# Patient Record
Sex: FEMALE | Race: WHITE | NOT HISPANIC OR LATINO | URBAN - METROPOLITAN AREA
[De-identification: names, ages, dates, MRNs, and addresses within clinical notes are randomized per-mention and may not be internally consistent; named-entity substitution may affect disease eponyms.]

---

## 2023-03-13 ENCOUNTER — TELEMEDICINE (OUTPATIENT)
Dept: PRIMARY CARE | Facility: CLINIC | Age: 63
End: 2023-03-13
Payer: COMMERCIAL

## 2023-03-13 DIAGNOSIS — J32.9 SINUSITIS, UNSPECIFIED CHRONICITY, UNSPECIFIED LOCATION: Primary | ICD-10-CM

## 2023-03-13 DIAGNOSIS — R05.1 ACUTE COUGH: ICD-10-CM

## 2023-03-13 PROCEDURE — 99213 OFFICE O/P EST LOW 20 MIN: CPT | Performed by: STUDENT IN AN ORGANIZED HEALTH CARE EDUCATION/TRAINING PROGRAM

## 2023-03-13 RX ORDER — CODEINE PHOSPHATE AND GUAIFENESIN 10; 100 MG/5ML; MG/5ML
5 SOLUTION ORAL EVERY 6 HOURS PRN
Qty: 118 ML | Refills: 0 | Status: SHIPPED | OUTPATIENT
Start: 2023-03-13 | End: 2023-03-20

## 2023-03-13 RX ORDER — DOXYCYCLINE 100 MG/1
100 CAPSULE ORAL 2 TIMES DAILY
Qty: 14 CAPSULE | Refills: 0 | Status: SHIPPED | OUTPATIENT
Start: 2023-03-13 | End: 2023-03-20

## 2023-03-13 ASSESSMENT — ENCOUNTER SYMPTOMS
WHEEZING: 0
SORE THROAT: 0
SHORTNESS OF BREATH: 0
HEARTBURN: 0
HEADACHES: 1
CHILLS: 0
HEMOPTYSIS: 0
WEIGHT LOSS: 0
FEVER: 0
MYALGIAS: 0
COUGH: 1
RHINORRHEA: 1
SWEATS: 0

## 2023-03-13 NOTE — PROGRESS NOTES
Subjective   Patient ID: Heather Friedman is a 62 y.o. female with recent COVID diagnosis s/p paxlovid who presents via a virtual visit for worsening malaise, sore throat, and productive cough.     HPI  Patient initially tested positive for COVID and was prescribed paxlovid on 2/25, took for 5 day course. 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 However in the past two days, she has felt much sicker again. Developed a wet cough. Coughing so much she is throwing up. Only got 3 hours of sleep due to constant coughing. Throat is very sore, losing voice. Has head and ear pressure. Taking Dayquil and mucinex which is loosening up mucous, but not helping very much. Benzonatate is not helping at all. Robitussin DM helped with sleep.    She did retest herself for COVID yesterday and there was a faint pink positive line.    Denies dyspnea, denies chest tightness. No fever. Pulse ox has been good at home.         Objective     Physical Exam  General: Tired appearing, in no acute distress   HEENT: EOMI, nasal congestion, MMM  Resp: Coughing frequently, normal work of breathing   Skin: No rashes seen on face   Neuro: Awake, alert, oriented x3, moving all 4 extremities  Psych: Appropriate mood and affect      Assessment/Plan   Heather Friedman is a 62 y.o. female with recent COVID diagnosis s/p paxlovid who presents via a virtual visit for worsening malaise, sore throat, and productive cough that began after a few days of improvement. Patient now feels worse than she felt with initial COVID. Differential includes rebound COVID following paxlovid vs sinusitis vs developing postviral bacterial pneumonia. Typically I would expect rebound COVID to be more mild, patient is feeling much worse than she did with initial COVID. She is having a lot of drainage and productive cough concerning for sinusitis. She currently is breathing comfortably with normal  pulse ox, so less likely pneumonia. Will treat as sinusitis. Her cough is quite bothersome and keeping her up all night, so will also do limited prescription of guaifenesin-codeine.       -Recommended ibuprofen for sore throat  -Doxycycline 7 days for sinusitis (allergic to penicillins and cephalosporins)  -Guaifenesin-codeine PRN for cough         Problem List Items Addressed This Visit    None  Visit Diagnoses       Sinusitis, unspecified chronicity, unspecified location    -  Primary    Relevant Medications    doxycycline (Vibramycin) 100 mg capsule    Acute cough        Relevant Medications    codeine-guaifenesin (Robitussin-AC)  mg/5 mL syrup                 Chiquita Sifuentes MD

## 2023-03-16 PROBLEM — H93.19 TINNITUS: Status: ACTIVE | Noted: 2023-03-16

## 2023-03-16 PROBLEM — U07.1 COVID-19: Status: ACTIVE | Noted: 2023-03-16

## 2023-03-16 PROBLEM — E78.00 ELEVATED LDL CHOLESTEROL LEVEL: Status: ACTIVE | Noted: 2023-03-16

## 2023-03-16 PROBLEM — N39.0 RECURRENT UTI: Status: ACTIVE | Noted: 2023-03-16

## 2023-03-16 PROBLEM — M25.40 JOINT SWELLING: Status: ACTIVE | Noted: 2023-03-16

## 2023-03-16 PROBLEM — K21.9 GERD (GASTROESOPHAGEAL REFLUX DISEASE): Status: ACTIVE | Noted: 2023-03-16

## 2023-03-16 RX ORDER — NITROFURANTOIN (MACROCRYSTALS) 100 MG/1
1 CAPSULE ORAL
COMMUNITY
End: 2024-03-15 | Stop reason: SDUPTHER

## 2023-03-16 RX ORDER — BENZONATATE 100 MG/1
1 CAPSULE ORAL 3 TIMES DAILY PRN
COMMUNITY
Start: 2023-02-25 | End: 2023-11-09 | Stop reason: WASHOUT

## 2023-03-16 RX ORDER — OMEPRAZOLE 20 MG/1
1 CAPSULE, DELAYED RELEASE ORAL
COMMUNITY
Start: 2020-03-18

## 2023-03-20 ENCOUNTER — OFFICE VISIT (OUTPATIENT)
Dept: PRIMARY CARE | Facility: CLINIC | Age: 63
End: 2023-03-20
Payer: COMMERCIAL

## 2023-03-20 VITALS
DIASTOLIC BLOOD PRESSURE: 80 MMHG | SYSTOLIC BLOOD PRESSURE: 116 MMHG | OXYGEN SATURATION: 99 % | HEART RATE: 76 BPM | WEIGHT: 151 LBS

## 2023-03-20 DIAGNOSIS — U07.1 COVID-19: Primary | ICD-10-CM

## 2023-03-20 PROCEDURE — 99213 OFFICE O/P EST LOW 20 MIN: CPT | Performed by: STUDENT IN AN ORGANIZED HEALTH CARE EDUCATION/TRAINING PROGRAM

## 2023-03-20 NOTE — PROGRESS NOTES
Subjective   Patient ID: Heather Friedman is a 63 y.o. female who presents for Follow-up (Coivd19 follow up).  HPI  Patient tested positive for COVID at the end of February and was prescribed paxlovid on 2/25. Felt better initially, however developed worsening malaise, sore throat, and productive cough that began after a few days of improvement. Had virtual appointment 3/13 with me and diagnosed with bacterial sinusitis. Prescribed doxycycline.    Tolerated doxycycline, did have nausea and vomited once. Tussin helped with cough. Did not end up taking codeine cough syrup. Coughed so hard at one point that she developed posterior vitreous detachment- saw ophthalmology.     She states she feels much better. Still coughing a little, sometimes productive. Tinnitus a little worse. Sinuses and ears still feel a little phlegmy and clogged, but improved. For first time today, feels like herself again.     Wondering if she is ok to travel to [place] this week.      Objective   Visit Vitals  /80   Pulse 76   Wt 68.5 kg (151 lb)   SpO2 99%      Physical Exam  General: Well appearing, conversational, in no acute distress  HEENT: EOMI, PERRL, nares patent without congestion, MMM, TMs clear bilaterally   CV: RRR, no murmurs  Resp: Lungs CTAB, normal work of breathing  GI: Soft, nondistended, nontender, BS+   Ext: No lower ext swelling  Skin: Warm, dry, no rashes  Neuro: Awake, alert, oriented x3, moving all 4 extremities, nonfocal, normal gait, ambulates without assistance  Psych: Appropriate mood and affect      Assessment/Plan   Heather Friedman is a 63 y.o. female who presents for Follow-up (Coivd19 follow up) and follow-up for bacterial sinusitis. She had posterior vitreous detachment from coughing. She is otherwise doing much better with only mild lingering cough. Recommended patient travel this week if she continues to feel well. She is unlikely contagious.   Problem List Items Addressed This Visit           Infectious/Inflammatory    COVID-19 - Primary            Chiquita Sifuentes MD MPH

## 2023-05-16 DIAGNOSIS — R91.1 INCIDENTAL PULMONARY NODULE, > 3MM AND < 8MM: Primary | ICD-10-CM

## 2023-05-16 NOTE — PROGRESS NOTES
Incidental 7mm R lower lobe nodule. Most likely benign. Will follow up in 6-12 months with noncontrast CT.

## 2023-10-09 ENCOUNTER — APPOINTMENT (OUTPATIENT)
Dept: PRIMARY CARE | Facility: CLINIC | Age: 63
End: 2023-10-09
Payer: COMMERCIAL

## 2023-11-09 DIAGNOSIS — M25.552 BILATERAL HIP PAIN: Primary | ICD-10-CM

## 2023-11-09 DIAGNOSIS — M25.551 BILATERAL HIP PAIN: Primary | ICD-10-CM

## 2023-11-21 DIAGNOSIS — M16.0 BILATERAL HIP JOINT ARTHRITIS: Primary | ICD-10-CM

## 2023-12-06 ENCOUNTER — APPOINTMENT (OUTPATIENT)
Dept: RADIOLOGY | Facility: CLINIC | Age: 63
End: 2023-12-06
Payer: COMMERCIAL

## 2024-03-15 DIAGNOSIS — N39.0 RECURRENT UTI: Primary | ICD-10-CM

## 2024-03-15 RX ORDER — NITROFURANTOIN (MACROCRYSTALS) 100 MG/1
100 CAPSULE ORAL NIGHTLY PRN
Qty: 45 CAPSULE | Refills: 3 | Status: SHIPPED | OUTPATIENT
Start: 2024-03-15 | End: 2025-03-15

## 2024-03-31 DIAGNOSIS — Z00.00 ANNUAL PHYSICAL EXAM: Primary | ICD-10-CM

## 2024-05-28 ENCOUNTER — APPOINTMENT (OUTPATIENT)
Dept: LAB | Facility: LAB | Age: 64
End: 2024-05-28
Payer: COMMERCIAL

## 2024-05-29 ENCOUNTER — LAB (OUTPATIENT)
Dept: LAB | Facility: LAB | Age: 64
End: 2024-05-29
Payer: COMMERCIAL

## 2024-05-29 DIAGNOSIS — Z00.00 ANNUAL PHYSICAL EXAM: ICD-10-CM

## 2024-05-29 LAB
25(OH)D3 SERPL-MCNC: 18 NG/ML (ref 31–100)
ALBUMIN SERPL BCP-MCNC: 4.1 G/DL (ref 3.4–5)
ALP SERPL-CCNC: 67 U/L (ref 33–136)
ALT SERPL W P-5'-P-CCNC: 17 U/L (ref 7–45)
ANION GAP SERPL CALC-SCNC: 13 MMOL/L (ref 10–20)
AST SERPL W P-5'-P-CCNC: 21 U/L (ref 9–39)
BILIRUB SERPL-MCNC: 0.4 MG/DL (ref 0–1.2)
BUN SERPL-MCNC: 17 MG/DL (ref 6–23)
CALCIUM SERPL-MCNC: 9.2 MG/DL (ref 8.6–10.3)
CHLORIDE SERPL-SCNC: 104 MMOL/L (ref 98–107)
CHOLEST SERPL-MCNC: 204 MG/DL (ref 133–200)
CHOLEST/HDLC SERPL: 2.6 {RATIO}
CO2 SERPL-SCNC: 27 MMOL/L (ref 21–32)
CREAT SERPL-MCNC: 0.69 MG/DL (ref 0.5–1.05)
EGFRCR SERPLBLD CKD-EPI 2021: >90 ML/MIN/1.73M*2
ERYTHROCYTE [DISTWIDTH] IN BLOOD BY AUTOMATED COUNT: 13.3 % (ref 11.5–14.5)
EST. AVERAGE GLUCOSE BLD GHB EST-MCNC: 120 MG/DL
GLUCOSE SERPL-MCNC: 108 MG/DL (ref 74–99)
HBA1C MFR BLD: 5.8 %
HCT VFR BLD AUTO: 39 % (ref 36–46)
HDLC SERPL-MCNC: 78 MG/DL
HGB BLD-MCNC: 12.4 G/DL (ref 12–16)
LDLC SERPL CALC-MCNC: 104 MG/DL (ref 65–130)
MCH RBC QN AUTO: 27.9 PG (ref 26–34)
MCHC RBC AUTO-ENTMCNC: 31.8 G/DL (ref 32–36)
MCV RBC AUTO: 88 FL (ref 80–100)
NRBC BLD-RTO: ABNORMAL /100{WBCS}
PLATELET # BLD AUTO: 240 X10*3/UL (ref 150–450)
POTASSIUM SERPL-SCNC: 4.6 MMOL/L (ref 3.5–5.3)
PROT SERPL-MCNC: 6.5 G/DL (ref 6.4–8.2)
RBC # BLD AUTO: 4.44 X10*6/UL (ref 4–5.2)
SODIUM SERPL-SCNC: 139 MMOL/L (ref 136–145)
TRIGL SERPL-MCNC: 108 MG/DL (ref 40–150)
WBC # BLD AUTO: 5.4 X10*3/UL (ref 4.4–11.3)

## 2024-05-29 PROCEDURE — 82306 VITAMIN D 25 HYDROXY: CPT

## 2024-05-29 PROCEDURE — 80061 LIPID PANEL: CPT

## 2024-05-29 PROCEDURE — 83036 HEMOGLOBIN GLYCOSYLATED A1C: CPT

## 2024-05-29 PROCEDURE — 36415 COLL VENOUS BLD VENIPUNCTURE: CPT

## 2024-05-29 PROCEDURE — 80053 COMPREHEN METABOLIC PANEL: CPT

## 2024-05-29 PROCEDURE — 85027 COMPLETE CBC AUTOMATED: CPT

## 2024-05-30 ENCOUNTER — OFFICE VISIT (OUTPATIENT)
Dept: PRIMARY CARE | Facility: CLINIC | Age: 64
End: 2024-05-30
Payer: COMMERCIAL

## 2024-05-30 VITALS
WEIGHT: 152 LBS | DIASTOLIC BLOOD PRESSURE: 80 MMHG | OXYGEN SATURATION: 96 % | HEIGHT: 62 IN | SYSTOLIC BLOOD PRESSURE: 128 MMHG | HEART RATE: 70 BPM | BODY MASS INDEX: 27.97 KG/M2

## 2024-05-30 DIAGNOSIS — R73.03 PREDIABETES: Primary | ICD-10-CM

## 2024-05-30 DIAGNOSIS — E66.3 OVERWEIGHT (BMI 25.0-29.9): ICD-10-CM

## 2024-05-30 DIAGNOSIS — Z12.11 COLON CANCER SCREENING: ICD-10-CM

## 2024-05-30 DIAGNOSIS — Z86.32 HISTORY OF GESTATIONAL DIABETES MELLITUS: ICD-10-CM

## 2024-05-30 DIAGNOSIS — L98.9 SKIN LESION: ICD-10-CM

## 2024-05-30 DIAGNOSIS — M25.751 OSTEOPHYTE OF RIGHT HIP: ICD-10-CM

## 2024-05-30 PROCEDURE — 99396 PREV VISIT EST AGE 40-64: CPT | Performed by: STUDENT IN AN ORGANIZED HEALTH CARE EDUCATION/TRAINING PROGRAM

## 2024-05-30 RX ORDER — SEMAGLUTIDE 0.25 MG/.5ML
0.25 INJECTION, SOLUTION SUBCUTANEOUS
Qty: 2 ML | Refills: 1 | Status: SHIPPED | OUTPATIENT
Start: 2024-05-30 | End: 2024-06-05 | Stop reason: SDUPTHER

## 2024-05-30 NOTE — PATIENT INSTRUCTIONS
Thank you for coming today Heather!    Please start wegovy 0.25mg weekly. If this is not covered, we will try trulicity next. Let's follow up in 2 months to see how this is going!    Please take 2000 units of vitamin D daily in addition to your multivitamin.    You can schedule your physical therapy by calling [phone].    Please make an appointment with Dr. Iavn in orthopedic surgery.    I have made a referral to dermatology. I recommend Dr. Juan A Parr and Dr. Aminata Grayson. Please call [phone] to make an appointment.       I will see you soon!

## 2024-05-30 NOTE — PROGRESS NOTES
"Subjective   Patient ID: Heather Friedman is a 64 y.o. female who presents for Annual Exam.  HPI  Concerns today:  #R hip pain  Hip bothers her 6 out of 7 days a week  Pain right over the joint   Cannot sleep on left side because it hurts the right side     #Weight gain  -Having trouble losing weight-- now overweight  -She was always a lower weight before menopause    #Skin spots     Chronic issues:  #Recurrent UTIs  -Typically occur after intercourse or exercise   -Nitrofurantoin on hand to use as ppx works very well   -Has seen urogyn previously  -CT urogram in 2020 at [facility] was normal      #GERD  -Omeprazole 20mg daily      #Allergic rhinitis   -Allegra     #Tinnitus   -Developed L ear ringing in early spring 2021 (remembers it was soon after receiving first covid vaccine)       #Joint overuse injuries  -Did PT for shoulder in the past  -Has had tennis elbow     #Prediabetes   -HgbA1c 5.8      #Hx of severe illness with pregnancy  -gestational DM, preeclampsia, HELLP syndrome (in ICU)     Health Maintenance:  -Colonoscopy at 50 normal, cologuard 2021-- normal, due 2024  -Normal mammogram 12/8/2023  -Normal pap 7/18/2022  -Hep C negative in 2021  -DEXA and pna vaccines at age 65  -Shingrix up to date   -COVID, flu and up to date   -Tdap 2021  -Takes daily B12 and MVI     Social History:  -Moved back to [place] in 2018 from [place]  -Two twin daughters  -/professor at [employer], [employer] writer, [employer]   -Exercises a lot  -Eats healthy  -Occasional cigarette (1-2 on days of stress, goes months without)  -Glass of wine every night     Family History:  -Dad has GI issues  -Brother Rangel has DM   -Parents living in [facility] in [facility] (dad and step-mom)   -Mother passed away at 73, diagnosed with stage 4 ovarian cancer, negative BRCA (OBGYN does ultrasound scan every year)    Objective   Visit Vitals  /80   Pulse 70   Ht 1.575 m (5' 2\")   Wt 68.9 kg (152 lb)   SpO2 96%   BMI 27.80 kg/m²   Smoking Status Some " Days   BSA 1.74 m²      Physical Exam  General: Well appearing, conversational, in no acute distress  HEENT: EOMI, PERRL, nares patent without congestion, MMM  CV: RRR, no murmurs  Resp: Lungs CTAB, normal work of breathing  GI: Soft, nondistended, nontender, BS+   Ext: No lower ext swelling  MSK: R hip tender to palpation, decreased ROM due to pain   Skin: Warm, dry, no, scattered nevi  Neuro: Awake, alert, oriented x3, moving all 4 extremities, nonfocal, normal gait, ambulates without assistance  Psych: Appropriate mood and affect      Assessment/Plan   Heather Friedman is a 64 y.o. female who presents for Annual Exam.    -She is having significant R hip pain, will refer to PT and refer to ortho hip specialist  -Will refer to dermatology for atypical nevi  -She is overweight with comorbidity of prediabetes and has history of gestational diabetes, thus she would benefit from a GLP-1 agonist-- will order this  -Recommend vitamin D 2000 units daily for vitamin D deficiency  -She is due for cologuard, will order this, otherwise up to date on preventative health    Problem List Items Addressed This Visit    None  Visit Diagnoses       Prediabetes    -  Primary    Overweight (BMI 25.0-29.9)        History of gestational diabetes mellitus        Osteophyte of right hip        Relevant Orders    Referral to Orthopaedic Surgery    Referral to Physical Therapy    Skin lesion        Relevant Orders    Referral to Dermatology    Colon cancer screening        Relevant Orders    Cologuard® colon cancer screening                 Chiquita Sifuentes MD MPH

## 2024-05-31 DIAGNOSIS — Z86.32 HISTORY OF GESTATIONAL DIABETES MELLITUS: ICD-10-CM

## 2024-05-31 DIAGNOSIS — R73.03 PREDIABETES: ICD-10-CM

## 2024-05-31 DIAGNOSIS — E66.3 OVERWEIGHT (BMI 25.0-29.9): ICD-10-CM

## 2024-06-05 RX ORDER — SEMAGLUTIDE 0.25 MG/.5ML
0.25 INJECTION, SOLUTION SUBCUTANEOUS
Qty: 2 ML | Refills: 1 | Status: SHIPPED | OUTPATIENT
Start: 2024-06-05 | End: 2024-06-09 | Stop reason: SDUPTHER

## 2024-06-09 DIAGNOSIS — E66.3 OVERWEIGHT (BMI 25.0-29.9): ICD-10-CM

## 2024-06-09 DIAGNOSIS — Z86.32 HISTORY OF GESTATIONAL DIABETES MELLITUS: ICD-10-CM

## 2024-06-09 DIAGNOSIS — R73.03 PREDIABETES: Primary | ICD-10-CM

## 2024-06-09 RX ORDER — SEMAGLUTIDE 0.25 MG/.5ML
0.25 INJECTION, SOLUTION SUBCUTANEOUS
Qty: 2 ML | Refills: 0 | Status: SHIPPED | OUTPATIENT
Start: 2024-06-09 | End: 2024-06-12 | Stop reason: WASHOUT

## 2024-06-12 RX ORDER — DULAGLUTIDE 0.75 MG/.5ML
0.75 INJECTION, SOLUTION SUBCUTANEOUS
Qty: 2 ML | Refills: 0 | Status: SHIPPED | OUTPATIENT
Start: 2024-06-16 | End: 2024-06-16 | Stop reason: WASHOUT

## 2024-06-14 DIAGNOSIS — Z86.32 HISTORY OF GESTATIONAL DIABETES: ICD-10-CM

## 2024-06-14 DIAGNOSIS — E66.3 OVERWEIGHT (BMI 25.0-29.9): ICD-10-CM

## 2024-06-14 DIAGNOSIS — R73.03 PREDIABETES: Primary | ICD-10-CM

## 2024-06-16 RX ORDER — SEMAGLUTIDE 0.25 MG/.5ML
0.25 INJECTION, SOLUTION SUBCUTANEOUS
Qty: 2 ML | Refills: 0 | Status: SHIPPED | OUTPATIENT
Start: 2024-06-16 | End: 2024-06-18 | Stop reason: SDUPTHER

## 2024-06-17 DIAGNOSIS — E66.3 OVERWEIGHT (BMI 25.0-29.9): ICD-10-CM

## 2024-06-17 DIAGNOSIS — R73.03 PREDIABETES: ICD-10-CM

## 2024-06-17 DIAGNOSIS — Z86.32 HISTORY OF GESTATIONAL DIABETES MELLITUS: ICD-10-CM

## 2024-06-18 RX ORDER — SEMAGLUTIDE 0.25 MG/.5ML
0.25 INJECTION, SOLUTION SUBCUTANEOUS
Qty: 2 ML | Refills: 0 | Status: SHIPPED | OUTPATIENT
Start: 2024-06-18 | End: 2024-06-19

## 2024-06-19 RX ORDER — SEMAGLUTIDE 0.25 MG/.5ML
0.25 INJECTION, SOLUTION SUBCUTANEOUS
Qty: 2 ML | Refills: 0 | Status: SHIPPED | OUTPATIENT
Start: 2024-06-19 | End: 2024-07-19

## 2024-06-25 DIAGNOSIS — R73.03 PREDIABETES: ICD-10-CM

## 2024-06-25 DIAGNOSIS — E66.3 OVERWEIGHT (BMI 25.0-29.9): Primary | ICD-10-CM

## 2024-06-25 DIAGNOSIS — Z86.32 HISTORY OF GESTATIONAL DIABETES MELLITUS: ICD-10-CM

## 2024-06-25 LAB — NONINV COLON CA DNA+OCC BLD SCRN STL QL: NEGATIVE

## 2024-07-09 ENCOUNTER — EVALUATION (OUTPATIENT)
Dept: PHYSICAL THERAPY | Facility: CLINIC | Age: 64
End: 2024-07-09
Payer: COMMERCIAL

## 2024-07-09 DIAGNOSIS — M25.551 RIGHT HIP PAIN: Primary | ICD-10-CM

## 2024-07-09 DIAGNOSIS — M25.751 OSTEOPHYTE OF RIGHT HIP: ICD-10-CM

## 2024-07-09 PROCEDURE — 97110 THERAPEUTIC EXERCISES: CPT | Mod: GP | Performed by: PHYSICAL THERAPIST

## 2024-07-09 PROCEDURE — 97161 PT EVAL LOW COMPLEX 20 MIN: CPT | Mod: GP | Performed by: PHYSICAL THERAPIST

## 2024-07-10 ASSESSMENT — ENCOUNTER SYMPTOMS
DEPRESSION: 0
LOSS OF SENSATION IN FEET: 0
OCCASIONAL FEELINGS OF UNSTEADINESS: 0

## 2024-07-10 NOTE — PROGRESS NOTES
Physical Therapy    Physical Therapy Evaluation and Treatment      Patient Name: Heather Friedman  MRN: [id number]  Today's Date: 7/10/2024  Start Time: 1115  Stop Time: 1200  Total Time: 45 mins  PT Evaluation Time Entry  PT Evaluation (Low) Time Entry: 30  PT Therapeutic Procedures Time Entry  Therapeutic Exercise Time Entry: 10     Insurance: MMO  PT visit limit: 40  Visit #1    Assessment:  Heather is a 64 y.o. active female presenting with chronic R anterolateral hip pain without acute incident. Exam shows TTP to R ASIS and hip flexor tendons, full, symmetrical and pain-free hip joint mobility, decreased strength and muscle performance in hip flexors, deep rotators and gluteals and decreased lower extremity flexibility. Pain limits her ability to cross her legs, drive, climb stairs, stand up after prolonged sitting and lay on her L side. Skilled PT is medically necessary to address these impairments and reduce pain to improve ADLs.    Plan:  OP PT Plan  Treatment/Interventions: Cryotherapy, Dry needling, Education/ Instruction, Hot pack, Manual therapy, Therapeutic exercises  PT Plan: Skilled PT  PT Frequency:  (Every other week)  Duration: 6-8 visits  Onset Date: 07/01/23  Number of Treatments Authorized: 40  Rehab Potential: Good  Plan of Care Agreement: Patient    Problem List Items Addressed This Visit             ICD-10-CM    Right hip pain - Primary M25.551    Relevant Orders    Follow Up In Physical Therapy     Other Visit Diagnoses         Codes    Osteophyte of right hip     M25.751    Relevant Orders    Follow Up In Physical Therapy          General:  Reason for Referral: Right hip pain  Referred By: Dr. Lucinda Sifuentes    Subjective    Heather presents with R anterolateral hip pain than began about 1 year ago which she attributes to training for half marathon (walking) around that time. She also recalls wearing a walking boot following R ankle injury that occurred in October 2022. She is a very active  individual and walks 25 miles per week. She experiences pain with crossing her legs, driving, climbing stairs, standing up after prolonged sitting and laying on her L side. Pain improves with Advil and Voltaren gel. She denies any radiating pain, n/t or swelling. She states pain hasn't been as bad the last few weeks because she hasn't been working ( at [employer]). She denies any pertinent medical hx.    Pt Goals: “Stop having pain.”    Precautions:  Precautions  STEADI Fall Risk Score (The score of 4 or more indicates an increased risk of falling): 0    Pain:  1/10 currently, 6/10 at worst, sharp in nature    Prior Level of Function:  Independent in all activities.    Objective   Palpation: TTP over R ASIS and hip flexor tendons    Gait: normal    Functional:   Squat- normal  Step down- normal    Hip AROM symmetrical, WNL and pain-free    MMT (R/L):   Iliopsoas- 4-/5; 4-/5  Hip ER- 4/5; 4/5  Hip Abd- 3+/5; 3+/5  Glute max- 3+/5; 3+/5  Quadriceps- 5/5; 5/5   Hamstrings- 4+/5; 4+/5    Length tests (R/L):  90/90 hamstrings- (20) degrees bilat  Ruma- +/ +  Ely- +/ +    Outcome Measures:  LEFS: 80/80    Treatments:  Therapeutic Exercise:  SLR x10 R  Supine psoas march yellow band x10 R  Bridges x10  Clamshells x10 R    EDUCATION:  Issued/reviewed HEP to be performed daily.    Goals:  Active       PT Problem       PT Goal 1       Start:  07/10/24    Expected End:  09/07/24       Increase B iliopsoas and hip ER MMT to at least 4+/5 and B hip abductors and glute max MMT to at least 4/5 to improve hip strength and control with walking and climbing stairs.         PT Goal 2       Start:  07/10/24    Expected End:  09/07/24       Pt will demonstrate (-) Ruma and Ely tests bilat to reduce abnormal forces on the hip and pelvis.         PT Goal 3       Start:  07/10/24    Expected End:  09/07/24       Pt will report 0/10 R hip pain to improve crossing her legs, driving, climbing stairs, standing up after prolonged  sitting and laying on her L side         PT Goal 4       Start:  07/10/24    Expected End:  09/07/24       Pt will demonstrate independence with HEP for proper self-management at home.

## 2024-07-26 ENCOUNTER — APPOINTMENT (OUTPATIENT)
Dept: ORTHOPEDIC SURGERY | Facility: CLINIC | Age: 64
End: 2024-07-26
Payer: COMMERCIAL

## 2024-08-13 ENCOUNTER — APPOINTMENT (OUTPATIENT)
Dept: PHYSICAL THERAPY | Facility: CLINIC | Age: 64
End: 2024-08-13
Payer: COMMERCIAL

## 2024-08-31 ENCOUNTER — TELEPHONE (OUTPATIENT)
Dept: PEDIATRICS | Facility: CLINIC | Age: 64
End: 2024-08-31

## 2024-08-31 DIAGNOSIS — U07.1 COVID-19: Primary | ICD-10-CM

## 2024-08-31 RX ORDER — NIRMATRELVIR AND RITONAVIR 300-100 MG
3 KIT ORAL 2 TIMES DAILY
Qty: 30 TABLET | Refills: 0 | Status: SHIPPED | OUTPATIENT
Start: 2024-08-31 | End: 2024-09-05

## 2024-09-13 ENCOUNTER — APPOINTMENT (OUTPATIENT)
Dept: ORTHOPEDIC SURGERY | Facility: CLINIC | Age: 64
End: 2024-09-13
Payer: COMMERCIAL

## 2024-09-15 DIAGNOSIS — R73.03 PREDIABETES: Primary | ICD-10-CM

## 2024-10-24 ENCOUNTER — LAB (OUTPATIENT)
Dept: LAB | Facility: LAB | Age: 64
End: 2024-10-24
Payer: COMMERCIAL

## 2024-10-24 DIAGNOSIS — R73.03 PREDIABETES: ICD-10-CM

## 2024-10-24 LAB
ANION GAP SERPL CALC-SCNC: 12 MMOL/L (ref 10–20)
BUN SERPL-MCNC: 15 MG/DL (ref 6–23)
CALCIUM SERPL-MCNC: 9.1 MG/DL (ref 8.6–10.3)
CHLORIDE SERPL-SCNC: 104 MMOL/L (ref 98–107)
CO2 SERPL-SCNC: 27 MMOL/L (ref 21–32)
CREAT SERPL-MCNC: 0.67 MG/DL (ref 0.5–1.05)
EGFRCR SERPLBLD CKD-EPI 2021: >90 ML/MIN/1.73M*2
EST. AVERAGE GLUCOSE BLD GHB EST-MCNC: 111 MG/DL
GLUCOSE SERPL-MCNC: 92 MG/DL (ref 74–99)
HBA1C MFR BLD: 5.5 %
POTASSIUM SERPL-SCNC: 4.4 MMOL/L (ref 3.5–5.3)
SODIUM SERPL-SCNC: 139 MMOL/L (ref 136–145)

## 2024-10-24 PROCEDURE — 36415 COLL VENOUS BLD VENIPUNCTURE: CPT

## 2024-10-24 PROCEDURE — 80048 BASIC METABOLIC PNL TOTAL CA: CPT

## 2024-10-24 PROCEDURE — 83036 HEMOGLOBIN GLYCOSYLATED A1C: CPT

## 2024-10-31 ENCOUNTER — APPOINTMENT (OUTPATIENT)
Dept: ORTHOPEDIC SURGERY | Facility: CLINIC | Age: 64
End: 2024-10-31
Payer: COMMERCIAL

## 2025-01-23 ENCOUNTER — APPOINTMENT (OUTPATIENT)
Dept: ORTHOPEDIC SURGERY | Facility: CLINIC | Age: 65
End: 2025-01-23
Payer: COMMERCIAL

## 2025-01-24 ENCOUNTER — APPOINTMENT (OUTPATIENT)
Dept: ORTHOPEDIC SURGERY | Facility: CLINIC | Age: 65
End: 2025-01-24
Payer: COMMERCIAL

## 2025-03-11 ENCOUNTER — APPOINTMENT (OUTPATIENT)
Dept: RADIOLOGY | Facility: HOSPITAL | Age: 65
End: 2025-03-11
Payer: COMMERCIAL

## 2025-03-11 ENCOUNTER — HOSPITAL ENCOUNTER (EMERGENCY)
Facility: HOSPITAL | Age: 65
Discharge: HOME | End: 2025-03-11
Attending: EMERGENCY MEDICINE
Payer: COMMERCIAL

## 2025-03-11 VITALS
OXYGEN SATURATION: 98 % | HEART RATE: 90 BPM | TEMPERATURE: 97.8 F | SYSTOLIC BLOOD PRESSURE: 127 MMHG | RESPIRATION RATE: 16 BRPM | WEIGHT: 142 LBS | BODY MASS INDEX: 26.13 KG/M2 | DIASTOLIC BLOOD PRESSURE: 75 MMHG | HEIGHT: 62 IN

## 2025-03-11 DIAGNOSIS — R11.2 NAUSEA AND VOMITING, UNSPECIFIED VOMITING TYPE: Primary | ICD-10-CM

## 2025-03-11 LAB
ANION GAP SERPL CALC-SCNC: 15 MMOL/L (ref 10–20)
APPEARANCE UR: CLEAR
BACTERIA #/AREA URNS AUTO: ABNORMAL /HPF
BASOPHILS # BLD AUTO: 0.03 X10*3/UL (ref 0–0.1)
BASOPHILS NFR BLD AUTO: 0.2 %
BILIRUB UR STRIP.AUTO-MCNC: NEGATIVE MG/DL
BUN SERPL-MCNC: 18 MG/DL (ref 6–23)
CALCIUM SERPL-MCNC: 9.4 MG/DL (ref 8.6–10.3)
CHLORIDE SERPL-SCNC: 103 MMOL/L (ref 98–107)
CO2 SERPL-SCNC: 26 MMOL/L (ref 21–32)
COLOR UR: COLORLESS
CREAT SERPL-MCNC: 0.72 MG/DL (ref 0.5–1.05)
EGFRCR SERPLBLD CKD-EPI 2021: >90 ML/MIN/1.73M*2
EOSINOPHIL # BLD AUTO: 0.09 X10*3/UL (ref 0–0.7)
EOSINOPHIL NFR BLD AUTO: 0.7 %
ERYTHROCYTE [DISTWIDTH] IN BLOOD BY AUTOMATED COUNT: 13.1 % (ref 11.5–14.5)
FLUAV RNA RESP QL NAA+PROBE: NOT DETECTED
FLUBV RNA RESP QL NAA+PROBE: NOT DETECTED
GLUCOSE SERPL-MCNC: 125 MG/DL (ref 74–99)
GLUCOSE UR STRIP.AUTO-MCNC: NORMAL MG/DL
HCT VFR BLD AUTO: 45 % (ref 36–46)
HGB BLD-MCNC: 15 G/DL (ref 12–16)
HOLD SPECIMEN: NORMAL
IMM GRANULOCYTES # BLD AUTO: 0.07 X10*3/UL (ref 0–0.7)
IMM GRANULOCYTES NFR BLD AUTO: 0.5 % (ref 0–0.9)
KETONES UR STRIP.AUTO-MCNC: ABNORMAL MG/DL
LACTATE SERPL-SCNC: 0.9 MMOL/L (ref 0.4–2)
LEUKOCYTE ESTERASE UR QL STRIP.AUTO: NEGATIVE
LIPASE SERPL-CCNC: 16 U/L (ref 9–82)
LYMPHOCYTES # BLD AUTO: 1.05 X10*3/UL (ref 1.2–4.8)
LYMPHOCYTES NFR BLD AUTO: 7.7 %
MCH RBC QN AUTO: 29 PG (ref 26–34)
MCHC RBC AUTO-ENTMCNC: 33.3 G/DL (ref 32–36)
MCV RBC AUTO: 87 FL (ref 80–100)
MONOCYTES # BLD AUTO: 0.57 X10*3/UL (ref 0.1–1)
MONOCYTES NFR BLD AUTO: 4.2 %
MUCOUS THREADS #/AREA URNS AUTO: ABNORMAL /LPF
NEUTROPHILS # BLD AUTO: 11.8 X10*3/UL (ref 1.2–7.7)
NEUTROPHILS NFR BLD AUTO: 86.7 %
NITRITE UR QL STRIP.AUTO: NEGATIVE
NRBC BLD-RTO: 0 /100 WBCS (ref 0–0)
PH UR STRIP.AUTO: 6.5 [PH]
PLATELET # BLD AUTO: 298 X10*3/UL (ref 150–450)
POTASSIUM SERPL-SCNC: 3.9 MMOL/L (ref 3.5–5.3)
PROT UR STRIP.AUTO-MCNC: ABNORMAL MG/DL
RBC # BLD AUTO: 5.17 X10*6/UL (ref 4–5.2)
RBC # UR STRIP.AUTO: ABNORMAL MG/DL
RBC #/AREA URNS AUTO: ABNORMAL /HPF
SARS-COV-2 RNA RESP QL NAA+PROBE: NOT DETECTED
SODIUM SERPL-SCNC: 140 MMOL/L (ref 136–145)
SP GR UR STRIP.AUTO: >1.05
SQUAMOUS #/AREA URNS AUTO: ABNORMAL /HPF
UROBILINOGEN UR STRIP.AUTO-MCNC: NORMAL MG/DL
WBC # BLD AUTO: 13.6 X10*3/UL (ref 4.4–11.3)
WBC #/AREA URNS AUTO: ABNORMAL /HPF

## 2025-03-11 PROCEDURE — 83690 ASSAY OF LIPASE: CPT | Performed by: SURGERY

## 2025-03-11 PROCEDURE — 99285 EMERGENCY DEPT VISIT HI MDM: CPT | Mod: 25 | Performed by: EMERGENCY MEDICINE

## 2025-03-11 PROCEDURE — 96361 HYDRATE IV INFUSION ADD-ON: CPT

## 2025-03-11 PROCEDURE — 2500000001 HC RX 250 WO HCPCS SELF ADMINISTERED DRUGS (ALT 637 FOR MEDICARE OP): Performed by: SURGERY

## 2025-03-11 PROCEDURE — 81001 URINALYSIS AUTO W/SCOPE: CPT | Performed by: EMERGENCY MEDICINE

## 2025-03-11 PROCEDURE — 83605 ASSAY OF LACTIC ACID: CPT | Performed by: GENERAL PRACTICE

## 2025-03-11 PROCEDURE — 87636 SARSCOV2 & INF A&B AMP PRB: CPT | Performed by: EMERGENCY MEDICINE

## 2025-03-11 PROCEDURE — 80048 BASIC METABOLIC PNL TOTAL CA: CPT | Performed by: GENERAL PRACTICE

## 2025-03-11 PROCEDURE — 2550000001 HC RX 255 CONTRASTS: Performed by: SURGERY

## 2025-03-11 PROCEDURE — 74177 CT ABD & PELVIS W/CONTRAST: CPT

## 2025-03-11 PROCEDURE — 74177 CT ABD & PELVIS W/CONTRAST: CPT | Performed by: RADIOLOGY

## 2025-03-11 PROCEDURE — 85025 COMPLETE CBC W/AUTO DIFF WBC: CPT | Performed by: GENERAL PRACTICE

## 2025-03-11 PROCEDURE — 2500000004 HC RX 250 GENERAL PHARMACY W/ HCPCS (ALT 636 FOR OP/ED): Performed by: SURGERY

## 2025-03-11 PROCEDURE — 36415 COLL VENOUS BLD VENIPUNCTURE: CPT | Performed by: GENERAL PRACTICE

## 2025-03-11 PROCEDURE — 96374 THER/PROPH/DIAG INJ IV PUSH: CPT | Mod: 59

## 2025-03-11 RX ORDER — ONDANSETRON 4 MG/1
4 TABLET, FILM COATED ORAL EVERY 8 HOURS PRN
Qty: 20 TABLET | Refills: 0 | Status: SHIPPED | OUTPATIENT
Start: 2025-03-11 | End: 2025-03-18

## 2025-03-11 RX ORDER — DICYCLOMINE HYDROCHLORIDE 10 MG/1
10 CAPSULE ORAL ONCE
Status: COMPLETED | OUTPATIENT
Start: 2025-03-11 | End: 2025-03-11

## 2025-03-11 RX ORDER — DICYCLOMINE HYDROCHLORIDE 10 MG/1
10 CAPSULE ORAL 4 TIMES DAILY PRN
Qty: 10 CAPSULE | Refills: 0 | Status: SHIPPED | OUTPATIENT
Start: 2025-03-11 | End: 2025-03-16

## 2025-03-11 RX ORDER — ONDANSETRON HYDROCHLORIDE 2 MG/ML
4 INJECTION, SOLUTION INTRAVENOUS ONCE
Status: COMPLETED | OUTPATIENT
Start: 2025-03-11 | End: 2025-03-11

## 2025-03-11 RX ADMIN — ONDANSETRON 4 MG: 2 INJECTION, SOLUTION INTRAMUSCULAR; INTRAVENOUS at 05:12

## 2025-03-11 RX ADMIN — SODIUM CHLORIDE 1000 ML: 9 INJECTION, SOLUTION INTRAVENOUS at 05:22

## 2025-03-11 RX ADMIN — IOHEXOL 75 ML: 350 INJECTION, SOLUTION INTRAVENOUS at 05:09

## 2025-03-11 RX ADMIN — DICYCLOMINE HYDROCHLORIDE 10 MG: 10 CAPSULE ORAL at 06:31

## 2025-03-11 ASSESSMENT — PAIN - FUNCTIONAL ASSESSMENT
PAIN_FUNCTIONAL_ASSESSMENT: 0-10
PAIN_FUNCTIONAL_ASSESSMENT: 0-10

## 2025-03-11 ASSESSMENT — PAIN SCALES - GENERAL
PAINLEVEL_OUTOF10: 0 - NO PAIN
PAINLEVEL_OUTOF10: 0 - NO PAIN

## 2025-03-11 ASSESSMENT — COLUMBIA-SUICIDE SEVERITY RATING SCALE - C-SSRS
6. HAVE YOU EVER DONE ANYTHING, STARTED TO DO ANYTHING, OR PREPARED TO DO ANYTHING TO END YOUR LIFE?: NO
1. IN THE PAST MONTH, HAVE YOU WISHED YOU WERE DEAD OR WISHED YOU COULD GO TO SLEEP AND NOT WAKE UP?: NO
2. HAVE YOU ACTUALLY HAD ANY THOUGHTS OF KILLING YOURSELF?: NO

## 2025-03-11 NOTE — PROGRESS NOTES
Transfer of care note:    I received this patient in signout -pending CT imaging and p.o. challenge if reassuring.  ED Course as of 03/16/25 1203   Tue Mar 11, 2025   0740 Patient feels markedly improved.  Imaging consistent with gastroenteritis.  Is providing urine sample.  No urinary symptoms currently.  No flank pain. [JM]      ED Course User Index  [JM] Mike Casiano MD         Diagnoses as of 03/16/25 1203   Nausea and vomiting, unspecified vomiting type     Urinalysis reassuring.  Patient is tolerating p.o. and feels well.  Explained findings, exam/study results, the importance of follow up and the plan moving forward; patient and/or caregiver verbalized understanding and agreement. All questions were answered and no new questions at this time. Patient will be discharged with strict return precautions, follow up plan with PCP.

## 2025-03-11 NOTE — ED TRIAGE NOTES
Pt presents to the ED by squad for N/V/D and abdominal pain. Pt states it started around 9pm tonight. Pt denies any CP and SOB.

## 2025-03-11 NOTE — ED PROVIDER NOTES
Chief Complaint   Patient presents with   • Nausea   • Vomiting   • Diarrhea     HPI:   Heather Friedman is an 64 y.o. female presented to the ED for chief complaint of abdominal pain.  She explains that around 9 PM this evening she developed nausea and vomiting.  She explains she has vomited about 20 times that was nonbloody nonbilious.  She explains shortly afterwards she developed epigastric abdominal cramping.  She also reports associated diarrhea without hematochezia.  Reports abdominal cramping that comes in waves.  She denies fever chills sweats.  No chest pain shortness of breath.  No headaches dizziness or lightheadedness.      Allergies   Allergen Reactions   • Fentanyl Other     syncope following colonoscopy, blood pressure was low   • Cephalosporins Rash and Hives   • Penicillins Rash and Hives   :  No past medical history on file.  No past surgical history on file.  No family history on file.     Physical Exam  Vitals and nursing note reviewed.   Constitutional:       General: She is not in acute distress.     Appearance: She is well-developed.   HENT:      Head: Normocephalic and atraumatic.      Right Ear: Tympanic membrane normal.      Left Ear: Tympanic membrane normal.      Nose: Nose normal.      Mouth/Throat:      Mouth: Mucous membranes are moist.      Pharynx: Oropharynx is clear.   Eyes:      Extraocular Movements: Extraocular movements intact.      Conjunctiva/sclera: Conjunctivae normal.      Pupils: Pupils are equal, round, and reactive to light.   Cardiovascular:      Rate and Rhythm: Normal rate and regular rhythm.      Heart sounds: No murmur heard.  Pulmonary:      Effort: Pulmonary effort is normal. No respiratory distress.      Breath sounds: Normal breath sounds.   Abdominal:      Palpations: Abdomen is soft.      Tenderness: There is no abdominal tenderness.   Musculoskeletal:         General: No swelling.      Cervical back: Neck supple.   Skin:     General: Skin is warm and  dry.      Capillary Refill: Capillary refill takes less than 2 seconds.   Neurological:      Mental Status: She is alert.   Psychiatric:         Mood and Affect: Mood normal.        VS: As documented in the triage note and EMR flowsheet from this visit were reviewed.    Medical Decision Making: This is a 64-year-old female presenting to the ED for chief complaint of nausea and vomiting and abdominal cramping that started about 6 hours ago.  Explains she has vomited about 20 times and she has been unable to stop.  She explains since arriving to the ED she has not vomited.  She does report nausea.  On exam her vitals are stable.  She is well-appearing.  Her mucous membranes are moist.  She is reporting excessive thirst.  She is medicated with Zofran and able to tolerate water.  She did not vomit after p.o. challenge. Her lab work was reassuring.  She had a slight leukocytosis of 13.6.  No metabolic derangement.  Kidney and liver function was normal.  Lactate was normal.  Lipase is normal limits.  She was treated with IV fluids and Bentyl. Patient was signed out to incoming dr casiano pending CT results.  Prescription for Zofran was sent to her pharmacy.  Likely DC home.  ED Course as of 03/13/25 2005   Tue Mar 11, 2025   0740 Patient feels markedly proved.  Imaging consistent with gastroenteritis.  Is providing urine sample.  No urinary symptoms currently.  No flank pain. [JM]      ED Course User Index  [JM] Mike Casiano MD         Diagnoses as of 03/13/25 2005   Nausea and vomiting, unspecified vomiting type     Counseling: Spoke with the patient and discussed today´s findings, in addition to providing specific details for the plan of care and expected course.  Patient was given the opportunity to ask questions.    I advised the patient that the emergency evaluation and treatment provided today doesn't end their need for medical care. It is very important that they follow-up with their primary care provider or  other specialist as instructed.    The plan of care was mutually agreed upon with the patient. The patient and/or family were given the opportunity to ask questions. All questions asked today in the ED were answered to the best of my ability with today's information.    This report was transcribed using voice recognition software.  Every effort was made to ensure accuracy, however, inadvertently computerized transcription errors may be present.       Mike Mejia PA-C  03/11/25 0612       Mike Mejia PA-C  03/13/25 2005

## 2025-03-11 NOTE — DISCHARGE INSTRUCTIONS
You were seen in the emergency room today for evaluation of nausea vomiting diarrhea.  Your imaging shows signs of enteritis.  Your symptoms improved with medications.  Please continue stay hydrated and take medications as needed as prescribed.  Please return to the emergency room if you have worsening symptoms or if you have any other concerns.

## 2025-03-11 NOTE — ED NOTES
Community Resource Name: No primary care physician.  Phone Number:   Staff Member:  Ngoc Saini    Discussed the following topics on behalf of the patient:  []  Behavioral Health Assistance     []  Case Management  []   Assistance  []  Digital Equity Assistance  []  Dental Health Assistance  []  Education Assistance  []  Employment Assistance  []  Financial Strain Relief Assistance  []  Food Insecurity Assistance  [x]  Healthcare Coverage Assistance  []  Housing Stability Assistance  []  IP Violence Relief Assistance  []  Legal Assistance  []  Physical Activity Assistance  []  Social Connection Assistance  []  Stress Relief Assistance   []  Substance Abuse Assistance  []  Transportation Assistance  []  Utility Assistance  [x]  Other: [insert comment here]  Patient does have a PCP. CHW updated the patient chart.  Next Steps:         JOHANNA Collins  Track patients for community healthcare services. CHW talked to patient regarding not having a primary care physician. Patient expressed that she does have a primary care physician from  named Dr. Chiquita Sifuentes and [facility] [place] [facility]. CHW updated the patient chart with the physician name added. Patient expressed appreciation.        JOHANNA Collins  03/11/25 3907

## 2025-03-18 DIAGNOSIS — J01.00 ACUTE NON-RECURRENT MAXILLARY SINUSITIS: Primary | ICD-10-CM

## 2025-03-18 RX ORDER — AZITHROMYCIN 250 MG/1
TABLET, FILM COATED ORAL
Qty: 6 TABLET | Refills: 0 | Status: SHIPPED | OUTPATIENT
Start: 2025-03-18 | End: 2025-03-23

## 2025-05-30 DIAGNOSIS — J01.00 ACUTE NON-RECURRENT MAXILLARY SINUSITIS: Primary | ICD-10-CM

## 2025-05-30 RX ORDER — AZITHROMYCIN 250 MG/1
TABLET, FILM COATED ORAL
Qty: 6 TABLET | Refills: 0 | Status: SHIPPED | OUTPATIENT
Start: 2025-05-30 | End: 2025-06-04

## 2025-06-20 DIAGNOSIS — Z29.89 NEED FOR PROPHYLAXIS AGAINST URINARY TRACT INFECTION: Primary | ICD-10-CM

## 2025-06-20 RX ORDER — NITROFURANTOIN 25; 75 MG/1; MG/1
100 CAPSULE ORAL DAILY PRN
Qty: 90 CAPSULE | Refills: 3 | Status: SHIPPED | OUTPATIENT
Start: 2025-06-20 | End: 2026-06-20